# Patient Record
Sex: FEMALE | Race: AMERICAN INDIAN OR ALASKA NATIVE
[De-identification: names, ages, dates, MRNs, and addresses within clinical notes are randomized per-mention and may not be internally consistent; named-entity substitution may affect disease eponyms.]

---

## 2019-03-29 ENCOUNTER — HOSPITAL ENCOUNTER (EMERGENCY)
Dept: HOSPITAL 5 - ED | Age: 69
Discharge: HOME | End: 2019-03-29
Payer: MEDICARE

## 2019-03-29 VITALS — SYSTOLIC BLOOD PRESSURE: 205 MMHG | DIASTOLIC BLOOD PRESSURE: 101 MMHG

## 2019-03-29 DIAGNOSIS — Z85.3: ICD-10-CM

## 2019-03-29 DIAGNOSIS — Z86.73: ICD-10-CM

## 2019-03-29 DIAGNOSIS — I10: ICD-10-CM

## 2019-03-29 DIAGNOSIS — H60.501: Primary | ICD-10-CM

## 2019-03-29 DIAGNOSIS — E11.9: ICD-10-CM

## 2019-03-29 DIAGNOSIS — J18.1: ICD-10-CM

## 2019-03-29 PROCEDURE — 71250 CT THORAX DX C-: CPT

## 2019-03-29 PROCEDURE — 71046 X-RAY EXAM CHEST 2 VIEWS: CPT

## 2019-03-29 NOTE — EMERGENCY DEPARTMENT REPORT
Chief Complaint: Upper Respiratory Infection


Stated Complaint: CHEST PAIN WHEN BREATHING


Time Seen by Provider: 03/29/19 14:28





- HPI


History of Present Illness: 





This is a 68 y.o. female that presents with right ear pain and chest discomfort 

with cough x 5 days.  Patient states she was getting over a cold last week but 

symptoms persist over the past 5 days.  





PMH:  DM & HTN





- Exam


Vital Signs: 


                                   Vital Signs











  03/29/19





  14:29


 


Temperature 98.1 F


 


Pulse Rate 82


 


Respiratory 20





Rate 


 


Blood Pressure 189/72


 


O2 Sat by Pulse 98





Oximetry 











MSE screening note: 


Focused history and physical exam performed.


Due to findings the following was ordered:





CXR





ACC for further evaluation.





ED Disposition for MSE


Condition: Stable

## 2019-03-29 NOTE — XRAY REPORT
PROCEDURE: XR CHEST ROUTINE 2V 

 

TECHNIQUE:  PA and lateral views of the chest 

 

HISTORY: chest discomfort with cough 

 

COMPARISONS: None  

 

FINDINGS: 

Visualization of detail is somewhat limited by artifact created by large body habitus. 

There is prominence of the interstitial markings in both lungs with peribronchial thickening, acute v
ersus chronic. 

There appear to be patchy areas of somewhat nodular pulmonary consolidation in the left mid and lower
 lung fields. 

There is no evidence of pneumothorax or pleural fluid collection. 

The cardiac silhouette is enlarged. 

There is atherosclerotic vascular calcification of the thoracic aorta. 

There is evidence of degenerative change of the right glenohumeral joint. 

Visualization of detail of the thoracic spine is limited. 

 

IMPRESSION: 

1. Prominence of the interstitial markings with peribronchial thickening and the appearance of somewh
at nodular areas of pulmonary consolidation in the left mid and lower lung fields. 

CT chest would be helpful for further evaluation. 

 

2. Enlarged cardiac silhouette. 

 

This document is electronically signed by Arielle Kim MD., March 29 2019 04:36:02 PM ET

## 2019-03-29 NOTE — CAT SCAN REPORT
PROCEDURE: CT CHEST WO CON 

 

TECHNIQUE:  Computerized axial tomography of the abdomen and pelvis was performed without contrast. C
oronal and sagittal reconstruction was also performed. 

 

CT DOSE LENGTH PRODUCT:  1263.9  mGycm 

 

HISTORY: off with abnormal chest x-ray recommendations by nasim HAMEED: CXR 3/29/2019 

 

 

FINDINGS: 

 

Patchy alveolar infiltrate in the left lower lobe posteriorly is seen consistent with atelectasis jovani
maxime infection. Calcified granuloma in the medial left lower lobe is also noted. Patchy atelectasis in
 the lingula is noted. No interstitial prominence throughout is seen. No peribronchial thickening is 
present on CT. 

 

There is no evidence for vascular congestion, pleural effusion, or pneumothorax. 

 

There is no evidence for mediastinal, hilar, or axillary adenopathy. The esophagus is collapsed. The 
trachea is midline.  

 

Calcification of the aorta is noted. Cardiac size is upper limits normal. The aorta is normal in yaniv
tamar. 

 

Images through the lung bases include upper abdomen which show no abnormality of the visualized abdom
inal viscera.  

 

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 No evidence for bon
y fracture is seen. Degenerative disc changes throughout the thoracic spine are seen with anterior os
teophytic bridging present. 

 

Bilateral breast implants are noted, much larger on the right than the left. On the right, there is a
n involuting implant capsule. The left sided implant could also represent a residual fibrotic capsule
 from implant removal. 

 

IMPRESSION  

 

1. Alveolar infiltrate in the left lower lobe consistent with atelectasis versus infection 

2. Patchy atelectasis in the lingula 

3. Asymmetry in the size of the breast implants 

 

This document is electronically signed by Janna Moyer MD., March 29 2019 06:15:03 PM ET

## 2019-03-29 NOTE — EMERGENCY DEPARTMENT REPORT
- General


Chief Complaint: Upper Respiratory Infection


Stated Complaint: CHEST PAIN WHEN BREATHING


Time Seen by Provider: 03/29/19 14:28


Source: patient


Mode of arrival: Wheelchair


Limitations: No Limitations





- History of Present Illness


Initial Comments: 





68-year-old -American female presents to the emergency room complaint of 

cough congestion sore throat right ear pain with drainage.  Onset was Monday.  

Patient reports right ear has had drainage nasal congestion chest congestion 

with green mucus.  Patient denies any fever or chills no nausea no vomiting.  He

has a past medical history of hypertension and diabetes.


MD Complaint: cough, rhinorrhea, nasal congestion, sinus pain


-: week(s) (1)


Quality: aching


Consistency: constant


Improves With: nothing


Worsens With: deep breaths


Associated Symptoms: rhinorrhea, nasal congestion, sore throat, cough, shortness

of breath.  denies: fever, chills, nausea, vomiting, diarrhea


Treatments Prior to Arrival: "cold medicine"





- Related Data


                                  Previous Rx's











 Medication  Instructions  Recorded  Last Taken  Type


 


Sulfamethoxazole/Trimethoprim 1 each PO BID #14 tablet 11/07/18 Unknown Rx





[Bactrim DS TAB]    


 


Azithromycin [Zithromax Z-MEHREEN] 250 mg PO DAILY #6 tab 03/29/19 Unknown Rx


 


Benzonatate [Tessalon Perle] 100 mg PO TID PRN #21 capsule 03/29/19 Unknown Rx


 


Cetirizine HCl [ZyrTEC] 10 mg PO QDAY #30 capsule 03/29/19 Unknown Rx


 


Cipro/Dexameth 0.3/0.1% [Ciprodex 4 drops OT BID #1 bottle 03/29/19 Unknown Rx





OTIC]    


 


Fluticasone Furoate [Flonase 1 spray NS QDAY #1 bottle 03/29/19 Unknown Rx





Sensimist]    











                                    Allergies











Allergy/AdvReac Type Severity Reaction Status Date / Time


 


No Known Allergies Allergy   Verified 03/29/19 14:10














ED Review of Systems


ROS: 


Stated complaint: CHEST PAIN WHEN BREATHING


Other details as noted in HPI





ENT: ear pain, throat pain, congestion, other (nasal congestion and rhinorrhea)


Respiratory: cough





ED Past Medical Hx





- Past Medical History


Hx Hypertension: Yes


Hx CVA: Yes (TIA)


Hx Diabetes: Yes


Additional medical history: Breast CA





- Surgical History


Additional Surgical History: Double mastectomy





- Social History


Smoking Status: Never Smoker


Substance Use Type: None





- Medications


Home Medications: 


                                Home Medications











 Medication  Instructions  Recorded  Confirmed  Last Taken  Type


 


Sulfamethoxazole/Trimethoprim 1 each PO BID #14 tablet 11/07/18  Unknown Rx





[Bactrim DS TAB]     


 


Azithromycin [Zithromax Z-MEHREEN] 250 mg PO DAILY #6 tab 03/29/19  Unknown Rx


 


Benzonatate [Tessalon Perle] 100 mg PO TID PRN #21 capsule 03/29/19  Unknown Rx


 


Cetirizine HCl [ZyrTEC] 10 mg PO QDAY #30 capsule 03/29/19  Unknown Rx


 


Cipro/Dexameth 0.3/0.1% [Ciprodex 4 drops OT BID #1 bottle 03/29/19  Unknown Rx





OTIC]     


 


Fluticasone Furoate [Flonase 1 spray NS QDAY #1 bottle 03/29/19  Unknown Rx





Sensimist]     














ED Physical Exam





- General


Limitations: No Limitations


General appearance: alert, in no apparent distress





- Head


Head exam: Present: atraumatic, normocephalic





- Expanded ENT Exam


  ** Expanded


TM/Canal exam: Canal Discharge: Right TM, Canal Tenderness: Right TM


Throat exam: Negative: tonsillomegaly





- Neck


Neck exam: Present: normal inspection, full ROM.  Absent: lymphadenopathy





- Respiratory


Respiratory exam: Present: normal lung sounds bilaterally.  Absent: respiratory 

distress





- Cardiovascular


Cardiovascular Exam: Present: regular rate, normal rhythm.  Absent: systolic 

murmur, diastolic murmur, rubs, gallop





- GI/Abdominal


GI/Abdominal exam: Present: soft, normal bowel sounds





- Neurological Exam


Neurological exam: Present: alert, oriented X3





- Psychiatric


Psychiatric exam: Present: normal affect, normal mood





- Skin


Skin exam: Present: warm, dry, intact, normal color.  Absent: rash





ED Course


                                   Vital Signs











  03/29/19





  14:29


 


Temperature 98.1 F


 


Pulse Rate 82


 


Respiratory 20





Rate 


 


Blood Pressure 189/72


 


O2 Sat by Pulse 98





Oximetry 











Critical care attestation.: 


If time is entered above; I have spent that time in minutes in the direct care 

of this critically ill patient, excluding procedure time.








ED Disposition


Clinical Impression: 


Right otitis externa


Qualifiers:


 Otitis externa type: unspecified type Chronicity: acute Qualified Code(s): 

H60.501 - Unspecified acute noninfective otitis externa, right ear





Pneumonia


Qualifiers:


 Pneumonia type: due to unspecified organism Laterality: left Lung location: 

lower lobe of lung Qualified Code(s): J18.1 - Lobar pneumonia, unspecified 

organism





Disposition: DC-01 TO HOME OR SELFCARE


Is pt being admited?: No


Does the pt Need Aspirin: No


Condition: Stable


Instructions:  Bacterial Pneumonia (ED), Otitis Externa (ED)


Additional Instructions: 


Please complete medications as prescribed.  Follow-up with her primary care 

provider if his symptoms persist or gets worse.


Prescriptions: 


Cipro/Dexameth 0.3/0.1% [Ciprodex OTIC] 4 drops OT BID #1 bottle


Fluticasone Furoate [Flonase Sensimist] 1 spray NS QDAY #1 bottle


Benzonatate [Tessalon Perle] 100 mg PO TID PRN #21 capsule


 PRN Reason: Cough


Azithromycin [Zithromax Z-MEHREEN] 250 mg PO DAILY #6 tab


Cetirizine HCl [ZyrTEC] 10 mg PO QDAY #30 capsule


Referrals: 


[facility], [place] [Primary Care Provider] - 3-5 Days